# Patient Record
Sex: FEMALE | Race: OTHER | HISPANIC OR LATINO
[De-identification: names, ages, dates, MRNs, and addresses within clinical notes are randomized per-mention and may not be internally consistent; named-entity substitution may affect disease eponyms.]

---

## 2019-05-01 ENCOUNTER — APPOINTMENT (OUTPATIENT)
Dept: PULMONOLOGY | Facility: CLINIC | Age: 44
End: 2019-05-01
Payer: MEDICAID

## 2019-05-01 VITALS
TEMPERATURE: 96.8 F | BODY MASS INDEX: 40.97 KG/M2 | WEIGHT: 240 LBS | HEIGHT: 64 IN | OXYGEN SATURATION: 97 % | SYSTOLIC BLOOD PRESSURE: 106 MMHG | HEART RATE: 107 BPM | DIASTOLIC BLOOD PRESSURE: 86 MMHG

## 2019-05-01 DIAGNOSIS — Z82.5 FAMILY HISTORY OF ASTHMA AND OTHER CHRONIC LOWER RESPIRATORY DISEASES: ICD-10-CM

## 2019-05-01 DIAGNOSIS — Z86.79 PERSONAL HISTORY OF OTHER DISEASES OF THE CIRCULATORY SYSTEM: ICD-10-CM

## 2019-05-01 DIAGNOSIS — Z86.59 PERSONAL HISTORY OF OTHER MENTAL AND BEHAVIORAL DISORDERS: ICD-10-CM

## 2019-05-01 PROBLEM — Z00.00 ENCOUNTER FOR PREVENTIVE HEALTH EXAMINATION: Status: ACTIVE | Noted: 2019-05-01

## 2019-05-01 PROCEDURE — 99204 OFFICE O/P NEW MOD 45 MIN: CPT | Mod: 25

## 2019-05-01 PROCEDURE — 94010 BREATHING CAPACITY TEST: CPT

## 2019-05-01 PROCEDURE — 95012 NITRIC OXIDE EXP GAS DETER: CPT

## 2019-05-01 RX ORDER — ESOMEPRAZOLE MAGNESIUM 5 MG/1
GRANULE, DELAYED RELEASE ORAL
Refills: 0 | Status: ACTIVE | COMMUNITY

## 2019-05-01 RX ORDER — RANITIDINE HYDROCHLORIDE 150 MG/1
150 CAPSULE ORAL
Qty: 30 | Refills: 2 | Status: ACTIVE | COMMUNITY
Start: 2019-05-01 | End: 1900-01-01

## 2019-05-01 RX ORDER — LOSARTAN POTASSIUM 100 MG/1
TABLET, FILM COATED ORAL
Refills: 0 | Status: ACTIVE | COMMUNITY

## 2019-05-01 RX ORDER — ESOMEPRAZOLE MAGNESIUM 40 MG/1
40 CAPSULE, DELAYED RELEASE ORAL
Qty: 1 | Refills: 2 | Status: ACTIVE | COMMUNITY
Start: 2019-05-01 | End: 1900-01-01

## 2019-05-01 RX ORDER — QUETIAPINE FUMARATE 100 MG/1
100 TABLET ORAL
Refills: 0 | Status: ACTIVE | COMMUNITY

## 2019-05-10 ENCOUNTER — APPOINTMENT (OUTPATIENT)
Dept: PULMONOLOGY | Facility: CLINIC | Age: 44
End: 2019-05-10
Payer: MEDICAID

## 2019-05-10 PROCEDURE — 94729 DIFFUSING CAPACITY: CPT

## 2019-05-10 PROCEDURE — 94727 GAS DIL/WSHOT DETER LNG VOL: CPT

## 2019-05-10 PROCEDURE — 94060 EVALUATION OF WHEEZING: CPT

## 2019-05-10 NOTE — PHYSICAL EXAM
[General Appearance - Well Developed] : well developed [General Appearance - Well Nourished] : well nourished [Normal Oropharynx] : normal oropharynx [Normal Conjunctiva] : the conjunctiva exhibited no abnormalities [General Appearance - In No Acute Distress] : no acute distress [Heart Sounds] : normal S1 and S2 [Heart Rate And Rhythm] : heart rate and rhythm were normal [Murmurs] : no murmurs present [Edema] : no peripheral edema present [Bowel Sounds] : normal bowel sounds [Auscultation Breath Sounds / Voice Sounds] : lungs were clear to auscultation bilaterally [Abdomen Soft] : soft [Abdomen Tenderness] : non-tender [Nail Clubbing] : no clubbing of the fingernails [Cyanosis, Localized] : no localized cyanosis [] : no rash [Affect] : the affect was normal [FreeTextEntry1] : no LAD

## 2019-05-10 NOTE — HISTORY OF PRESENT ILLNESS
[FreeTextEntry1] : 05/01/2019: Asked to evaluate patient by NP Nilesh Rowland for cough. Coughing started in December without other symptoms. Treated w macrolide, Augmentin, no improvement. Albuterol inhaler has not helped at all. Nasal spray for a month did not help at all. Allergy meds did not help. Changed from ACE-I to losartan which did not help. Cough incontinence. Coricidin helps a little. A steroid pack helped. Worse at night, worse outside, worse w cleaning products or perfumes. Frequent heartburn. On Nexium, wasn't faithful before the cough but takes qod now without relief of cough. No sputum. Some dyspnea. Cough is better than when it first started. Never allergic, never asthma.\par

## 2019-05-10 NOTE — CONSULT LETTER
[Dear  ___] : Dear ~AGNIESZKA, [( Thank you for referring [unfilled] for consultation for _____ )] : Thank you for referring [unfilled] for consultation for [unfilled] [Please see my note below.] : Please see my note below. [Sincerely,] : Sincerely, [Consult Closing:] : Thank you very much for allowing me to participate in the care of this patient.  If you have any questions, please do not hesitate to contact me. [FreeTextEntry2] : Nilesh Rowland NP\par [address] \[place] [address] [FreeTextEntry3] : Tere Schofield MD, FCCP\par

## 2019-05-10 NOTE — ASSESSMENT
[FreeTextEntry1] : Data reviewed:\par \par PA/lat CXR [facility] 1/2019 personally reviewed: normal chest\par \par [place] 05/01/2019 : normal / FENO 18\par \par Impression:\par Chronic cough\par \par Plan:\par Several factors support asthma: environmental triggers, improved w steroids, family hx, worse at night.\par Will bring her back for methacholine challenge.\par However, is having significant GERD which is another likely factor. Will treat aggressively w PPI in morning and H2 blocker at night.\par --\par The cough improved somewhat on the reflux medicine. Better at night but unchanged by day.\par PFT 5/10/19: normal margarita w sig BD response, normal volumes and DLCO 71%\par Will treat as asthma w Symbicort (or as covered) x 1 month. Then RTO.

## 2019-06-11 ENCOUNTER — APPOINTMENT (OUTPATIENT)
Dept: PULMONOLOGY | Facility: CLINIC | Age: 44
End: 2019-06-11
Payer: MEDICAID

## 2019-06-11 VITALS
OXYGEN SATURATION: 97 % | TEMPERATURE: 98.7 F | BODY MASS INDEX: 40.97 KG/M2 | WEIGHT: 240 LBS | DIASTOLIC BLOOD PRESSURE: 80 MMHG | HEART RATE: 112 BPM | HEIGHT: 64 IN | SYSTOLIC BLOOD PRESSURE: 100 MMHG

## 2019-06-11 PROCEDURE — 94010 BREATHING CAPACITY TEST: CPT

## 2019-06-11 PROCEDURE — 99213 OFFICE O/P EST LOW 20 MIN: CPT | Mod: 25

## 2019-06-11 NOTE — CONSULT LETTER
[Dear  ___] : Dear ~AGNIESZKA, [Courtesy Letter:] : I had the pleasure of seeing your patient, [unfilled], in my office today. [Please see my note below.] : Please see my note below. [Consult Closing:] : Thank you very much for allowing me to participate in the care of this patient.  If you have any questions, please do not hesitate to contact me. [Sincerely,] : Sincerely, [FreeTextEntry3] : Tere Schofield MD, FCCP\par  [FreeTextEntry2] : Nilesh Rowland NP\par [address] \[place] [address]

## 2019-06-11 NOTE — HISTORY OF PRESENT ILLNESS
[FreeTextEntry1] : 05/01/2019: Asked to evaluate patient by ÁNGELA Rowland for cough. Coughing started in December without other symptoms. Treated w macrolide, Augmentin, no improvement. Albuterol inhaler has not helped at all. Nasal spray for a month did not help at all. Allergy meds did not help. Changed from ACE-I to losartan which did not help. Cough incontinence. Coricidin helps a little. A steroid pack helped. Worse at night, worse outside, worse w cleaning products or perfumes. Frequent heartburn. On Nexium, wasn't faithful before the cough but takes qod now without relief of cough. No sputum. Some dyspnea. Cough is better than when it first started. Never allergic, never asthma.\par \par Given PPI, cough improved somewhat. PFT normal but w BD response --> trial of Symbicort.\par \par 6/11/19: Cough is markedly better. Taking Symbicort religiously now (not at first) and not taking the H2B and not taking PPI religiously. So seems as if Symbicort was the key, not the reflux treatment. (Or passage of time.)

## 2019-06-11 NOTE — ASSESSMENT
[FreeTextEntry1] : Data reviewed:\par \par PA/lat CXR [facility] 1/2019 personally reviewed: normal chest\par \par [place] 05/01/2019 : normal / FENO 18\par PFT 5/10/19: normal margarita w sig BD response, normal volumes and DLCO 71%\par [place]ar[place] 6/11/19: normal\par \par Impression:\par Chronic cough - due to asthma?\par \par Plan:\par She does have historical features consistent with asthma, but this is still only a working diagnosis based on BD response and response to Symbicort. I would suggest that she try going off Symbicort in a month or two and see if the cough recurs.

## 2019-09-02 PROBLEM — Z86.59 HISTORY OF POST TRAUMATIC STRESS DISORDER: Status: RESOLVED | Noted: 2019-05-01 | Resolved: 2019-09-02

## 2020-04-15 ENCOUNTER — APPOINTMENT (OUTPATIENT)
Dept: PULMONOLOGY | Facility: CLINIC | Age: 45
End: 2020-04-15

## 2020-04-15 DIAGNOSIS — R05 COUGH: ICD-10-CM

## 2020-04-15 NOTE — HISTORY OF PRESENT ILLNESS
[FreeTextEntry1] : 05/01/2019: Asked to evaluate patient by ÁNGELA Rowland for cough. Coughing started in December without other symptoms. Treated w macrolide, Augmentin, no improvement. Albuterol inhaler has not helped at all. Nasal spray for a month did not help at all. Allergy meds did not help. Changed from ACE-I to losartan which did not help. Cough incontinence. Coricidin helps a little. A steroid pack helped. Worse at night, worse outside, worse w cleaning products or perfumes. Frequent heartburn. On Nexium, wasn't faithful before the cough but takes qod now without relief of cough. No sputum. Some dyspnea. Cough is better than when it first started. Never allergic, never asthma.\par \par Given PPI, cough improved somewhat. PFT normal but w BD response --> trial of Symbicort.\par \par 6/11/19: Cough is markedly better. Taking Symbicort religiously now (not at first) and not taking the H2B and not taking PPI religiously. So seems as if Symbicort was the key, not the reflux treatment. (Or passage of time.)\par \par 4/15/2020:  Pt presents for follow up with complaints of XXXX

## 2020-04-15 NOTE — ASSESSMENT
[FreeTextEntry1] : Data reviewed:\par \par PA/lat CXR [facility] 1/2019 personally reviewed: normal chest\par \par [place] 05/01/2019 : normal / FENO 18\par PFT 5/10/19: normal margarita w sig BD response, normal volumes and DLCO 71%\par [place] 6/11/19: normal\par \par Impression:\par Chronic cough - due to asthma?\par \par Plan:\par She does have historical features consistent with asthma, but this is still only a working diagnosis based on BD response and response to Symbicort. I would suggest that she try going off Symbicort in a month or two and see if the cough recurs.

## 2021-01-27 ENCOUNTER — OUTPATIENT (OUTPATIENT)
Dept: OUTPATIENT SERVICES | Facility: HOSPITAL | Age: 46
LOS: 1 days | End: 2021-01-27
Payer: COMMERCIAL

## 2021-01-27 ENCOUNTER — APPOINTMENT (OUTPATIENT)
Dept: ORTHOPEDIC SURGERY | Facility: CLINIC | Age: 46
End: 2021-01-27
Payer: MEDICAID

## 2021-01-27 ENCOUNTER — RESULT REVIEW (OUTPATIENT)
Age: 46
End: 2021-01-27

## 2021-01-27 DIAGNOSIS — Q65.89 OTHER SPECIFIED CONGENITAL DEFORMITIES OF HIP: ICD-10-CM

## 2021-01-27 PROCEDURE — 99072 ADDL SUPL MATRL&STAF TM PHE: CPT

## 2021-01-27 PROCEDURE — 73521 X-RAY EXAM HIPS BI 2 VIEWS: CPT

## 2021-01-27 PROCEDURE — 73521 X-RAY EXAM HIPS BI 2 VIEWS: CPT | Mod: 26

## 2021-01-27 PROCEDURE — 99203 OFFICE O/P NEW LOW 30 MIN: CPT

## 2021-01-27 RX ORDER — MELOXICAM 7.5 MG/1
7.5 TABLET ORAL DAILY
Qty: 60 | Refills: 0 | Status: ACTIVE | COMMUNITY
Start: 2021-01-27 | End: 1900-01-01

## 2021-01-31 NOTE — PHYSICAL EXAM
[Normal] : Gait: normal [Hip Muscle Tightness Ruth's Test Left] : negative Ruth's test [Hip Instability Laxity Left] : no joint laxity [Sacroiliac Woody-Fabere Test Left Side] : negative Jonathan [Left Hip Was Examined] : negative impingement test [Hip Motion Left Contracture Flexion (___ Deg.) Jose Test] : negative Jose' test [Sacroiliac Compression Test Left Side] : negative sacroiliac joint compression test [Hip Muscle Tightness 90-90 Straight Leg Raising Test Left] : negative straight leg raise [de-identified] : Constitutional: well developed, well nourished and no acute distress. \par Eyes: the conjunctiva exhibited no abnormalities. \par HEENT: normal oropharynx. \par Neck:. no LAD. \par Cardiovascular: heart rate and rhythm were normal, normal S1 and S2, no murmurs present and no peripheral edema present. \par Pulmonary: lungs were clear to auscultation bilaterally. \par Abdomen: normal bowel sounds, soft, non-tender and no hepato-splenomegaly. \par Extremities: no clubbing of the fingernails and no localized cyanosis. \par Skin: no rash. \par Psychiatric: the affect was normal [de-identified] : B/L Hips: Pain with er/ir [de-identified] : intact [de-identified] : hip: showing b/l hip dysplasia

## 2021-01-31 NOTE — HISTORY OF PRESENT ILLNESS
[Pain Location] : pain [] : right & left hip [Worsening] : worsening [de-identified] : 44 y/o female with bilateral hip pain r>l for approx 2 years. Patient also states groin pain.

## 2021-01-31 NOTE — ASSESSMENT
[FreeTextEntry1] : Imp: 46 y/o female with b/l hip dysplasia\par \par \par Plan: mri b/l hip to r/o labrum tears\par          [facility]\par           HEP\par All medical record entries made by the PA/Scribbo/Fellow are at my, Dr. Anthony Carl's direction and personally dictated by me on 01/27/2021]. I have reviewed the chart and agree that the record accurately reflects my personal performance of the history, physical exam, assessment, and plan. I have also personally directed reviewed, and agreed with the chart.\par

## 2021-02-26 ENCOUNTER — APPOINTMENT (OUTPATIENT)
Dept: PULMONOLOGY | Facility: CLINIC | Age: 46
End: 2021-02-26
Payer: MEDICAID

## 2021-02-26 VITALS
HEART RATE: 95 BPM | HEIGHT: 64 IN | WEIGHT: 239 LBS | OXYGEN SATURATION: 98 % | DIASTOLIC BLOOD PRESSURE: 80 MMHG | SYSTOLIC BLOOD PRESSURE: 108 MMHG | TEMPERATURE: 97.2 F | BODY MASS INDEX: 40.8 KG/M2

## 2021-02-26 PROCEDURE — 99214 OFFICE O/P EST MOD 30 MIN: CPT

## 2021-02-26 PROCEDURE — 99072 ADDL SUPL MATRL&STAF TM PHE: CPT

## 2021-02-26 RX ORDER — BUDESONIDE AND FORMOTEROL FUMARATE DIHYDRATE 80; 4.5 UG/1; UG/1
80-4.5 AEROSOL RESPIRATORY (INHALATION) TWICE DAILY
Qty: 1 | Refills: 11 | Status: ACTIVE | COMMUNITY
Start: 2019-05-10 | End: 1900-01-01

## 2021-02-26 NOTE — CONSULT LETTER
[Dear  ___] : Dear ~AGNIESZKA, [Courtesy Letter:] : I had the pleasure of seeing your patient, [unfilled], in my office today. [Please see my note below.] : Please see my note below. [Consult Closing:] : Thank you very much for allowing me to participate in the care of this patient.  If you have any questions, please do not hesitate to contact me. [Sincerely,] : Sincerely, [FreeTextEntry2] : Nilesh Rowland NP\par [address] \[place] [address] [FreeTextEntry3] : Tere Schofield MD, FCCP\par

## 2021-02-26 NOTE — HISTORY OF PRESENT ILLNESS
[TextBox_4] : 05/01/2019: Asked to evaluate patient by NP Nilesh Rowland for cough. Coughing started in December without other symptoms. Treated w macrolide, Augmentin, no improvement. Albuterol inhaler has not helped at all. Nasal spray for a month did not help at all. Allergy meds did not help. Changed from ACE-I to losartan which did not help. Cough incontinence. Coricidin helps a little. A steroid pack helped. Worse at night, worse outside, worse w cleaning products or perfumes. Frequent heartburn. On Nexium, wasn't faithful before the cough but takes qod now without relief of cough. No sputum. Some dyspnea. Cough is better than when it first started. Never allergic, never asthma.\par \par Given PPI, cough improved somewhat. PFT normal but w BD response --> trial of Symbicort.\par \par 6/11/19: Cough is markedly better. Taking Symbicort religiously now (not at first) and not taking the H2B and not taking PPI religiously. So seems as if Symbicort was the key, not the reflux treatment. (Or passage of time.) \par \par 2/26/21: Here w daughter. Has been healthy this year, no Covid, working from home. No new health problems. Is coughing again, bothering her and daughter; had stopped taking Symbicort, just hasn't really been following up because of Covid. Here today because of incidental 3mm nodules on a CT (done for renal disease) in 1/2020. Never smoker. No lung cancer in family. She has had cervical cancer excised with no other treatment years ago no recurrence and does have continued gyn follow up.

## 2021-02-26 NOTE — ASSESSMENT
[FreeTextEntry1] : Data reviewed:\par \par Report only CT chest [facility] 1/2020: 3 3mm lung nodules\par \par [place] 05/01/2019 : normal / FENO 18\par PFT 5/10/19: normal margarita w sig BD response, normal volumes and DLCO 71%\par [place] 6/11/19: normal\par \par Impression:\par 3mm lung nodules\par Chronic cough - due to asthma?\par \par Plan:\par There is no indication for follow up of these nodules per [employer] guidelines.\par The cough has responded in the past to Symbicort and she can try this again.\par Covid vax discussed.

## 2023-01-18 ENCOUNTER — APPOINTMENT (OUTPATIENT)
Dept: NEUROLOGY | Facility: CLINIC | Age: 48
End: 2023-01-18